# Patient Record
Sex: MALE | Race: BLACK OR AFRICAN AMERICAN
[De-identification: names, ages, dates, MRNs, and addresses within clinical notes are randomized per-mention and may not be internally consistent; named-entity substitution may affect disease eponyms.]

---

## 2019-11-22 ENCOUNTER — HOSPITAL ENCOUNTER (OUTPATIENT)
Dept: HOSPITAL 72 - SUR | Age: 59
Discharge: HOME | End: 2019-11-22
Payer: COMMERCIAL

## 2019-11-22 VITALS — SYSTOLIC BLOOD PRESSURE: 98 MMHG | DIASTOLIC BLOOD PRESSURE: 53 MMHG

## 2019-11-22 VITALS — BODY MASS INDEX: 29.8 KG/M2 | WEIGHT: 220 LBS | HEIGHT: 72 IN

## 2019-11-22 VITALS — DIASTOLIC BLOOD PRESSURE: 84 MMHG | SYSTOLIC BLOOD PRESSURE: 174 MMHG

## 2019-11-22 VITALS — SYSTOLIC BLOOD PRESSURE: 109 MMHG | DIASTOLIC BLOOD PRESSURE: 64 MMHG

## 2019-11-22 VITALS — DIASTOLIC BLOOD PRESSURE: 82 MMHG | SYSTOLIC BLOOD PRESSURE: 170 MMHG

## 2019-11-22 VITALS — DIASTOLIC BLOOD PRESSURE: 84 MMHG | SYSTOLIC BLOOD PRESSURE: 166 MMHG

## 2019-11-22 VITALS — DIASTOLIC BLOOD PRESSURE: 79 MMHG | SYSTOLIC BLOOD PRESSURE: 175 MMHG

## 2019-11-22 VITALS — DIASTOLIC BLOOD PRESSURE: 61 MMHG | SYSTOLIC BLOOD PRESSURE: 101 MMHG

## 2019-11-22 VITALS — SYSTOLIC BLOOD PRESSURE: 111 MMHG | DIASTOLIC BLOOD PRESSURE: 67 MMHG

## 2019-11-22 VITALS — DIASTOLIC BLOOD PRESSURE: 82 MMHG | SYSTOLIC BLOOD PRESSURE: 165 MMHG

## 2019-11-22 VITALS — DIASTOLIC BLOOD PRESSURE: 69 MMHG | SYSTOLIC BLOOD PRESSURE: 119 MMHG

## 2019-11-22 VITALS — SYSTOLIC BLOOD PRESSURE: 115 MMHG | DIASTOLIC BLOOD PRESSURE: 65 MMHG

## 2019-11-22 VITALS — SYSTOLIC BLOOD PRESSURE: 169 MMHG | DIASTOLIC BLOOD PRESSURE: 85 MMHG

## 2019-11-22 VITALS — DIASTOLIC BLOOD PRESSURE: 75 MMHG | SYSTOLIC BLOOD PRESSURE: 142 MMHG
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
## 2019-11-22 DIAGNOSIS — E66.9: ICD-10-CM

## 2019-11-22 DIAGNOSIS — K21.9: ICD-10-CM

## 2019-11-22 DIAGNOSIS — Z88.0: ICD-10-CM
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
## 2019-11-22 DIAGNOSIS — S76.112A: Primary | ICD-10-CM

## 2019-11-22 DIAGNOSIS — I10: ICD-10-CM

## 2019-11-22 PROCEDURE — 94003 VENT MGMT INPAT SUBQ DAY: CPT

## 2019-11-22 PROCEDURE — 82962 GLUCOSE BLOOD TEST: CPT

## 2019-11-22 PROCEDURE — 94150 VITAL CAPACITY TEST: CPT

## 2019-11-22 PROCEDURE — 27385 REPAIR OF THIGH MUSCLE: CPT

## 2019-11-22 NOTE — OPERATIVE NOTE - PDOC
Operative Note


Operative Note


Pre-op Diagnosis:


left quadricps tendon tear


Procedure:


see op report


Post-op Diagnosis:  same as pre-op plus


Operative Findings:  consistent w/pre-op dx studies


Anesthesia:  regional


Specimen:  none


Complications:  none


Condition:  stable


Estimated Blood Loss:  none


Implant(s) used?:  No











Donte Carrillo MD Nov 22, 2019 11:54

## 2019-11-22 NOTE — PRE-PROCEDURE NOTE/ATTESTATION
Pre-Procedure Note/Attestation


Complete Prior to Procedure


Planned Procedure:  left


Procedure Narrative:


open quadricep tendon  tear





Indications for Procedure


Pre-Operative Diagnosis:


left quadricps tendon tear





Attestation


I attest that I discussed the nature of the procedure; its benefits; risks and 

complications; and alternatives (and the risks and benefits of such alternatives

), prior to the procedure, with the patient (or the patient's legal 

representative).





I attest that, if there was a reasonable possibility of needing a blood 

transfusion, the patient (or the patient's legal representative) was given the 

[facility] standardized written summary, pursuant 

to the Edwar Ander Blood Safety Act ([place] Health and Safety Code # 1645, as 

amended).





I attest that I re-evaluated the patient just prior to the surgery and that 

there has been no change in the patient's H&P, except as documented below:











Donte Carrillo MD Nov 22, 2019 11:53

## 2019-11-22 NOTE — ANETHESIA PREOPERATIVE EVAL
Anesthesia Pre-op PMH/ROS


General


Date of Evaluation:  Nov 22, 2019


Anesthesiologist:  [place]


ASA Score:  ASA 2


Mallampati Score


Class I : Soft palate, uvula, fauces, pillars visible


Class II: Soft palate, uvula, fauces visible


Class III: Soft palate, base of uvula visible


Class IV: Only hard plate visible


Mallampati Classification:  Class III


Surgeon:  Gerardo


Diagnosis:  L.eft quadriceps tear


Surgical Procedure:  Left quadriceps repair


Anesthesia History:  none


Family History:  no anesthesia problems


Allergies:  


Coded Allergies:  


     PENICILLINS (Verified  Allergy, Unknown, 11/21/19)


Medications:  see eMAR


Patient NPO?:  Yes


NPO Date:  Nov 21, 2019


NPO Time:  22:00





Past Medical History


Cardiovascular:  Reports: HTN; 


   Denies: CAD, MI, valve dz, arrhythmia, other


Pulmonary:  Denies: asthma, COPD, RUDOLPH, other


Gastrointestinal/Genitourinary:  Reports: GERD; 


   Denies: CRI, ESRD, other


Neurologic/Psychiatric:  Denies: dementia, CVA, depression/anxiety, TIA, other


Endocrine:  Reports: DM; 


   Denies: hypothyroidism, steroids, other


HEENT:  Denies: cataract (L), cataract (R), glaucoma, [place] (L), [place] (R), other


Hematology/Immune:  Denies: anemia, DVT, bleeding disorder, other


Musculoskeletal/Integumentary:  Denies: OA, RA, DJD, DDD, edema, other


Other:  obesity


PSxH Narrative:


right eye sx





Anesthesia Pre-op Phys. Exam


Physician Exam





Last Vital Signs








  Date Time  Temp Pulse Resp B/P (MAP) Pulse Ox O2 Delivery O2 Flow Rate FiO2


 


11/22/19 10:20      Room Air  


 


11/22/19 10:02 97.0 77 18 142/75 99   








Constitutional:  NAD


Cardiovascular:  RRR


Respiratory:  CTA





Airway Exam


Mallampati Score:  Class III


MO:  limited


ROM:  limited





Anesthesia Pre-op A/P


Labs


see chart





Studies


Pre-op Studies:  EKG - sr





Risk Assessment & Plan


Assessment:


ASA II


Plan:


GA with adductor nerve block


Status Change Before Surgery:  No





Pre-Antibiotics


Drug:  Clindamycin 600mg


Given Within 1 Hr of Incision:  Yes











Cleo Easton MD Nov 22, 2019 10:35

## 2019-11-22 NOTE — OPERATIVE NOTE - DICTATED
DATE OF OPERATION:  11/22/2019

PREOPERATIVE DIAGNOSIS:  Left traumatic quad tendon tear.



POSTOPERATIVE DIAGNOSIS:  Left traumatic quad tendon tear.



PROCEDURE:

1. Left open quadriceps tendon repair.

2. Evacuation of the hematoma left knee.

3. Primary repair of medial and lateral retinaculum tear.



SURGEON:  Donte Carrillo M.D.



ANESTHESIA:  Femoral adductor block with general.



INDICATION FOR PROCEDURE:  The patient is a pleasant gentleman, who

sustained a traumatic quad tear.  He was indicative of operative fixation.

Risks, limitations, expectations, and complications of the procedure were

discussed in detail.  All questions addressed.



DESCRIPTION OF PROCEDURE:  After informed consent was obtained, the patient

was brought to the operating room.  The patient was placed under general

anesthesia with femoral adductor block.  Left knee was prepped and draped

in a sterile manner.  Time-out was performed.  There was complete

disruption of the extensor mechanism with palpable defect ________

attached to the patella.  Anterior skin incision was then made.  Medial

and lateral skin flaps were created.  After the medial and lateral skin

flaps were created, there was obvious complete detachment of the

quadriceps tendon.  There were some fibers more deep that were still

attached.  Two Krackow stitches were placed through the segment of the

quadriceps tendon passed through the patella and tacked to the patellar

tendon.  This recreated the extensor mechanism continuity.



At this point, #1 FiberWire then used to close the medial and lateral

retinaculum.  This further secured the fixation.  Once this was completed,

the knee was flexed approximately 60 degrees with no motion at the repair

site.  The patella was nice and stable at the conclusion of the procedure.

The skin was closed using #1 Vicryl suture, 2-0 Vicryl suture, and 3-0

Monocryl sutures.  Dermabond and compression dressing was applied.  The

patient was awoken and taken to recovery room with stable vital signs.



ESTIMATED BLOOD LOSS:  None.



COMPLICATIONS:  None.



SPECIMENS:  None.



IMPLANTS:  Include #2 FiberWire.









  ______________________________________________

  Donte Carrillo M.D.





DR:  [employer]

D:  11/22/2019 16:01

T:  11/22/2019 21:08

JOB#:  [id number]

CC:

## 2019-11-22 NOTE — IMMEDIATE POST-OP EVALUATION
Immediate Post-Op Evalulation


Immediate Post-Op Evalulation


Procedure:  Left quadriceps repair


Date of Evaluation:  Nov 22, 2019


Time of Evaluation:  16:16


IV Fluids:  700


Blood Products:  0


Estimated Blood Loss:  10


Urinary Output:  0


Blood Pressure Systolic:  98


Blood Pressure Diastolic:  53


Pulse Rate:  69


Respiratory Rate:  17


O2 Sat by Pulse Oximetry:  100


Temperature (Fahrenheit):  97.2


Pain Score (1-10):  0


Nausea:  No


Vomiting:  No


Complications


0


Patient Status:  awake, reacts, patent, none


Hydration Status:  adequate


Drug:  Clindamycin 600mh


Given Within 1 Hr of Incision:  Yes











Cleo Easton MD Nov 22, 2019 16:15